# Patient Record
Sex: MALE | Race: OTHER | NOT HISPANIC OR LATINO | Employment: OTHER | URBAN - METROPOLITAN AREA
[De-identification: names, ages, dates, MRNs, and addresses within clinical notes are randomized per-mention and may not be internally consistent; named-entity substitution may affect disease eponyms.]

---

## 2021-06-08 NOTE — PATIENT DISCUSSION
THE PATIENT UNDERSTANDS THAT THE COMPROMISES AND VISUAL SYMPTOMS FROM PATIENT TO PATIENT IS VARIABLE. IT IS IMPOSSIBLE TO PREDICT THE OUTCOME OF THE LENS BEING OFFERED. SYNERGY IS DESIGNED TO DECREASE DEPENDENCY ON GLASSES FOR NEAR, INTERMEDIATE AND DISTANCE. COMPROMISES INCLUDE DECREASED QUALITY OF VISION, DEPTH OF FOCUS AND CONTRAST SENSITIVITY. VISUAL DISTURBANCES INCLUDE STARBURSTS, RINGS AND HALOS USUALLY NOTICED AROUND POINT SOURCES OF LIGHT AT NIGHT.

## 2021-06-23 NOTE — PATIENT DISCUSSION
Continue: prednisol ace-gatiflox-bromfen (prednisol ace-gatiflox-bromfen): drops,suspension: 1-0.5-0.075% 1 drop three times a day as directed [facility]

## 2022-03-07 ENCOUNTER — NEW PATIENT (OUTPATIENT)
Dept: URBAN - METROPOLITAN AREA CLINIC 35 | Facility: CLINIC | Age: 77
End: 2022-03-07

## 2022-03-07 VITALS — HEIGHT: 60 IN | DIASTOLIC BLOOD PRESSURE: 98 MMHG | SYSTOLIC BLOOD PRESSURE: 162 MMHG

## 2022-03-07 DIAGNOSIS — H53.2: ICD-10-CM

## 2022-03-07 DIAGNOSIS — H25.13: ICD-10-CM

## 2022-03-07 DIAGNOSIS — H43.813: ICD-10-CM

## 2022-03-07 DIAGNOSIS — H49.22: ICD-10-CM

## 2022-03-07 PROCEDURE — 92134 CPTRZ OPH DX IMG PST SGM RTA: CPT

## 2022-03-07 PROCEDURE — 92004 COMPRE OPH EXAM NEW PT 1/>: CPT

## 2022-03-07 ASSESSMENT — VISUAL ACUITY
OU_CC: 20/20
OU_SC: 20/50-1
OU_SC: J12
OS_CC: 20/25
OD_SC: 20/80+1
OS_SC: J12<
OS_SC: 20/60+2
OD_CC: 20/25+2
OD_SC: J12<
OD_CC: J1
OS_CC: J1

## 2022-03-07 ASSESSMENT — KERATOMETRY
OS_AXISANGLE2_DEGREES: 30
OD_K1POWER_DIOPTERS: 44.00
OD_K2POWER_DIOPTERS: 44.25
OS_K1POWER_DIOPTERS: 43.75
OD_AXISANGLE_DEGREES: 120
OS_AXISANGLE_DEGREES: 120
OS_K2POWER_DIOPTERS: 44.00
OD_AXISANGLE2_DEGREES: 30

## 2022-03-07 ASSESSMENT — TONOMETRY
OS_IOP_MMHG: 10
OD_IOP_MMHG: 14